# Patient Record
Sex: MALE | Race: WHITE
[De-identification: names, ages, dates, MRNs, and addresses within clinical notes are randomized per-mention and may not be internally consistent; named-entity substitution may affect disease eponyms.]

---

## 2019-04-09 ENCOUNTER — HOSPITAL ENCOUNTER (EMERGENCY)
Dept: HOSPITAL 80 - FED | Age: 29
Discharge: HOME | End: 2019-04-09
Payer: COMMERCIAL

## 2019-04-09 VITALS — SYSTOLIC BLOOD PRESSURE: 128 MMHG | DIASTOLIC BLOOD PRESSURE: 76 MMHG

## 2019-04-09 DIAGNOSIS — Z88.0: ICD-10-CM

## 2019-04-09 DIAGNOSIS — K08.89: Primary | ICD-10-CM

## 2019-04-09 DIAGNOSIS — F17.200: ICD-10-CM

## 2019-04-09 DIAGNOSIS — K02.9: ICD-10-CM

## 2019-04-09 PROCEDURE — 3E0X3BZ INTRODUCTION OF ANESTHETIC AGENT INTO CRANIAL NERVES, PERCUTANEOUS APPROACH: ICD-10-PCS

## 2019-04-09 NOTE — EDPHY
General


Time Seen by Provider: 04/09/19 22:06


Narrative: 





CLINICAL IMPRESSION: Dental Caries


 


ASSESSMENT/PLAN: 





Patient is a 28 year old male with no significant medical history who presents 

with dental pain that started after eating cereal earlier this evening.  

Patient afebrile on arrival, he was uncomfortable appearing however not toxic 

appearing.  Physical exam reveals overall poor dentition and severe dental 

caries at tooth 3 and 30, associated pain at these sites. Patient had no facial 

swelling or cellulitis, no evidence of periapical abscess, ANUG, Ganga's angina

, no trismus, no angioedema or evidence of airway compromise.  A dental block 

was performed with significant improvement of his discomfort.  On repeat exam 

and prior to discharge he is much more comfortable appearing.  He understands 

that is it imperative for him to follow-up with a dentist for more definitive 

care.  Strict return precautions discussed- patient to return to the ED for 

increased or unmanageable pain, fever, difficulty breathing or swallowing, 

facial swelling, facial redness, facial numbness, swelling of the tongue, throat

, or floor of the mouth, or for any other new, worsening or worrisome symptoms.

  Patient verbalizes understanding and agrees with the plan.





ED PROCEDURES: 





Procedure:  Dental block.





A dental block was performed for what indication-dental pain.  The block was 

performed with 0.5% bupivacaine and 1% lidocaine.  The patient experienced 

marked improvement.  The procedure was performed by myself.





ED COURSE: 


2206:  PDMP accessed, no medication use.





CHIEF COMPLAINT:  Right Upper and lower dental pain 





HPI: 





Patient is a 28 year old male with no significant medical history who presents 

to the emergency department with complaints of right upper and lower dental 

pain with radiation into his jaw and cheek.  He has known poor dentition with 

remote extractions.  He has several teeth that he knows are "rotten" however 

"hates the dentist" and has not scheduled an appointment for evaluation.  He 

does report dental pain in the past however never this severe.  Occurred 

directly after eating cereal earlier this evening.  Pain is located at mid 

right upper and lower teeth radiating into right jaw and cheek, causing a mild 

headache now.  Denies fever, change in appetite, facial swelling, facial redness

, oral swelling, sore throat, difficulty swallowing or difficulty opening 

mouth.  Took some ibuprofen several hours prior to arrival with little relief.  

Also denies any trauma.  


_________________ 


PAST MEDICAL HISTORY: Denies


Family History: Not contributory 


Social History: Current smoker, denies current illicit drug use 


_________________ 


ROS: 


A full 10 point review of systems was negative except for those mentioned in 

HPI. 


_________________ 


PHYSICAL EXAM: 


General Appearance: Alert, uncomfortable however not toxic appearing. 


HENT: 


Normocephalic, atraumatic.


There is no facial swelling or erythema.


TMs are clear bilaterally no perforation or FB, no injection, no evidence of 

serous or mucopurulent otitis. Oropharynx clear is no erythema or exudates, no 

tonsillar hypertrophy or asymmetry. 





Tooth 3. And tooth number 30 both with obvious dental caries.  Patient has 

multiple missing teeth and overall poor dentition.  There is no evidence of 

periapical abscess at either location. No trismus. Sub lingual/submental space 

soft without swelling.  No TMJ tenderness bilaterally.


 


Eyes: PERRLA, EOMI intact. Conjunctiva pink, no pallor or injection.


Neck: Supple, nontender, no lymphadenopathy, no midline pain, FROM, no 

meningismus. 


Respiratory: There are no retractions, lungs are clear to auscultation.


Cardiac: Regular rate and rhythm, no murmurs or gallops. 


Gastrointestinal: Abdomen is soft, nontender, bowel sounds normal, no masses/

hernia, no rigidity, guarding or focal peritoneal findings. 


Skin: Warm, dry, no rashes, no nodules on palpation. 


_________________ 


MEDICAL DECISION MAKING: 


Patient was seen independently. Secondary supervising physician at time of 

evaluation was Dr. Oliva, he did not evaluate this patient. 


Diagnosis: Dental pain, dental caries. 


Summary: See Assessment and Plan for summary of ED visit


Clinical lab tests: Not applicable. 


Independent visualization of images, tracing, or specimens: Not applicable. 


Decision to obtain medical records or history from someone other than the 

patient: No


Review / Summarize previous medical records: Yes 


Discussed patient with another provider: Yes, Dr. Olvia 


Patient Progress: Stable, discharge. 





- History


Smoking Status: Current every day smoker





- Objective


Vital Signs: 


 Initial Vital Signs











Temperature (C)  36.6 C   04/09/19 21:41


 


Heart Rate  83   04/09/19 21:41


 


Respiratory Rate  18   04/09/19 21:41


 


Blood Pressure  132/101 H  04/09/19 21:41


 


O2 Sat (%)  98   04/09/19 21:41








 











O2 Delivery Mode               Room Air














Allergies/Adverse Reactions: 


 





Penicillins Allergy (Mild, Verified 04/09/19 21:44)


 Rash








Home Medications: 














 Medication  Instructions  Recorded


 


NK [No Known Home Meds]  04/09/19











Medications Given: 


 








Discontinued Medications





Acetaminophen (Tylenol)  1,000 mg PO EDNOW ONE


   Stop: 04/09/19 22:05


   Last Admin: 04/09/19 22:14 Dose:  1,000 mg








Departure





- Departure


Disposition: Home, Routine, Self-Care


Clinical Impression: 


 Dental caries





Condition: Good


Instructions:  Toothache (ED)


Additional Instructions: 


DISCHARGE INSTRUCTIONS FROM YOUR DOCTOR 


Thank you for visiting our emergency department today. Please keep in mind that 

discharge from the emergency department does not mean that there is nothing 

wrong - it simply means that we have not identified an emergency condition that 

requires further evaluation or treatment in the hospital. You should always 

plan to follow up with primary care for re-evaluation of your condition in the 

next 2-3 days. 





Rest, push fluids, healthy diet -- all to help your immune system fight any 

possible infection.





Brush your teeth often. Gargle. Floss. Take good care of your teeth.





For pain control:


You may take Tylenol, I recommend 500-1000 mg every 6-8 hours as needed.  Take 

with food and a full glass of water.  Stop taking if this is upsetting her 

stomach.  Do not exceed 4000 mg in a 24 hr period.





You may also take ibuprofen, recommend 400 mg every 6 hr.  Take with food and a 

full glass of water.  Stop taking if this upsets her stomach.  Do not exceed 

2400 mg in a 24 hr period.





Dental Aid: [phone]





Schedule with a dentist to be seen as soon as possible. See our list of 

resources if helpful. The dentist will need to provide definitive treatment. 





Return for increased or unmanageable pain, bleeding, redness, drainage, swelling

, development of fever, chills, vomiting, rash, difficulty breathing or 

swallowing, facial swelling, facial redness, facial numbness, tingling, weakness

, or droop, swelling of the tongue, throat, or floor of the mouth, chest pain, 

shortness of breath, neck pain, neck stiffness, drooling, vomiting, rash, 

dizziness, fainting, or for any other new, worsening or worrisome symptoms.





People present with illnesses and injuries in different ways, and it is always 

possible that we have missed something. You may always return for re-evaluation 

if symptoms worsen or if they are not improving or if you develop new/different 

symptoms. 





Again, thank you for choosing our emergency department. We hope that you feel 

better.


Referrals: 


Alicia Verma MD [Primary Care Provider] - 1-2 days without fail